# Patient Record
Sex: MALE | Employment: PART TIME | URBAN - METROPOLITAN AREA
[De-identification: names, ages, dates, MRNs, and addresses within clinical notes are randomized per-mention and may not be internally consistent; named-entity substitution may affect disease eponyms.]

---

## 2024-08-20 ENCOUNTER — OFFICE VISIT (OUTPATIENT)
Dept: SURGERY | Age: 29
End: 2024-08-20

## 2024-08-20 VITALS
SYSTOLIC BLOOD PRESSURE: 122 MMHG | HEIGHT: 65 IN | DIASTOLIC BLOOD PRESSURE: 81 MMHG | WEIGHT: 151 LBS | TEMPERATURE: 98.4 F | HEART RATE: 64 BPM | OXYGEN SATURATION: 100 % | BODY MASS INDEX: 25.16 KG/M2

## 2024-08-20 DIAGNOSIS — L29.0 ANAL ITCHING: Primary | ICD-10-CM

## 2024-08-20 DIAGNOSIS — K64.1 GRADE II HEMORRHOIDS: ICD-10-CM

## 2024-08-20 PROCEDURE — 99204 OFFICE O/P NEW MOD 45 MIN: CPT | Performed by: SURGERY

## 2024-08-20 NOTE — PROGRESS NOTES
[facility]ANS [facility]  [facility] COLORECTAL SURGERY  [address] [place] [address]  [address]  [place] [address]  Dept: [phone]  Dept Fax: [phone]  Loc: [phone]    Visit Date: 8/20/2024    Aj Regan is a 29 y.o. male who presents today for: New Patient (Anal itching)      HPI:       Aj Regan is a 29 y.o. male referred to me for further evaluation guarding anal itching.  History and discussion was done with in person official     Aj tells me he has had symptoms for almost 2 months.  He went to the ER for similar issues.  Denies family history of colorectal cancer.  Denies previous colonoscopy.  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    No past medical history on file.    No past surgical history on file.    Cancer-related family history is not on file.    Social History:   Social History     Tobacco Use    Smoking status: Never    Smokeless tobacco: Never   Substance Use Topics    Alcohol use: Never      Tobacco cessation counseling provided as appropriate.    No colonoscopy on file   No cologuard on file  No FIT/FOBT on file   No flexible sigmoidoscopy on file      Objective:     Physical Exam   /81   Pulse 64   Temp 98.4 °F (36.9 °C) (Infrared)   Ht 1.651 m (5' 5\")   Wt 68.5 kg (151 lb)   SpO2 100%   BMI 25.13 kg/m²   Constitutional: Appears well-developed and well-nourished. Grooming appropriate. No gross deformities. Body mass index is 25.13 kg/m².    Abdominal/wound: Soft, nontender    Anorectal Exam: Chaperone present in room throughout exam.  Patient placed in the left lateral position.  Buttocks spread.  Digital rectal exam performed with lubricated finger.  Anoscopy performed.  Findings reveal: Mild perianal irritation, and large internal hemorrhoids    Labs reviewed: None  Radiology reviewed: None  Last colonoscopy: None  Coordination/discussion with: None    Assessment/Plan:     A/P:  New undiagnosed

## 2024-08-20 NOTE — PATIENT INSTRUCTIONS
PRURITIS ANI = \"Itchy butt\" AND Excessive Moisture and Anal seepage Instructions    Follow these steps:    1. Ensure soft stools without diarrhea:    - Use twice daily fiber supplementation (Metamucil, Benefiber, Citrucel, or generic brand)     - men should aim for 30 grams of fiber daily     - women should aim for 25 grams of fiber daily    - Slowly increase fiber supplement intake until you reach these goals    - Drink 6-8 glasses of water, and incorporate healthy fruits and vegetables into your diet.    If stools are still hard, add daily MiraLax  If stools are too loose, slowly add Imodium or Lomotil    2. Do not strain or push on the toilet. Do not read or play games on the toilet.    3.  Stop using perfumes, soaps, clothing dye, fabric softeners, or strong detergents in your laundry. While showering, use non-scented Dove soap and a hand-held shower head to gently cleanse your bottom. Dry using low heat from a hair dryer.    4. After bowel movements, cleanse gently with non-medicated moist baby wipes. Let air dry, and then use a zinc-based barrier cream (such as Desitin or Calmoseptine). Wear loose fitting clothing. Avoid using rough toilet paper or excessive wiping.    5. Avoid the following items in your diet that can cause weakening of the sphincter muscle. STOP for 2 weeks, then slowly reintroduce back one-by-one in a trial fashion:    Alcohol, tea, coffee, caffeine, cola, chocolate, tomatoes/ketchup, spicy foods, and excessive dairy.    - Keep a diary to carefully track your diet and your stool changes    6. Avoid over-the-counter remedies, creams, and suppositories, as these may contain various chemicals that can irritate the skin.    7. If you feel the urge to scratch - use a baby wipe to clean the area, then reapply Desitin/Calmoseptine cream, and most importantly, DO NOT SCRATCH!    Follow-up as instructed, typically in 6-8 weeks.       <<-----Click on this checkbox to enter Procedure Metatarsal osteotomy with Adair osteotomy for correction of hallux valgus, with correction of hammer toe  07/20/2018  Colton/Adair, Mod. Lida, KAMILLE releasae 2 and 3, T&C 2-5, Exc. STM right foot  Active  DLIVINGSTON

## 2024-08-27 ENCOUNTER — OFFICE VISIT (OUTPATIENT)
Dept: SURGERY | Age: 29
End: 2024-08-27

## 2024-08-27 VITALS
HEART RATE: 66 BPM | BODY MASS INDEX: 25.99 KG/M2 | HEIGHT: 65 IN | SYSTOLIC BLOOD PRESSURE: 135 MMHG | TEMPERATURE: 99 F | WEIGHT: 156 LBS | DIASTOLIC BLOOD PRESSURE: 77 MMHG | OXYGEN SATURATION: 100 %

## 2024-08-27 DIAGNOSIS — K64.1 GRADE II HEMORRHOIDS: ICD-10-CM

## 2024-08-27 DIAGNOSIS — L29.0 ANAL ITCHING: Primary | ICD-10-CM

## 2024-08-27 PROCEDURE — 46221 LIGATION OF HEMORRHOID(S): CPT | Performed by: SURGERY

## 2024-08-27 NOTE — PROGRESS NOTES
INTERNAL HEMORRHOID RUBBER BAND LIGATION PROCEDURE NOTE:    Patient presented with symptomatic internal hemorrhoids unresponsive to conservative management. See previous office notes for details of previous history and treatments.     Risks of rubber band ligation explained to patient, including but not limited to: immediate and delayed bleeding, pain, infection, external hemorrhoids thrombosis, pelvic sepsis, urinary retention, sphincter dysfunction, need for additional procedures, and recurrence. Patient was previously provided with information in office AVS (after visit summary) and given opportunity to ask any questions and bring up any concerns.     Chaperone/MA present in room during entire procedure.    Patient was placed in either lateral or knee-chest positioning depending on patient preference. Exam table manipulated for proper visualization and lighting. Buttocks spread. Digital exam and anoscopy performed confirming internal hemorrhoids.    Suction rubber band ligator used to place band in 3 positions, 1 cm proximal to the dentate line, at the apex of the internal hemorrhoid.    Anoscope removed. Patient tolerated the procedure well without complication. EBL minimal. Post procedure expectations and instructions explained to patient. Written instructions provided as well.    Disposition: Follow up PRN       Electronically signed by Sal Johansen MD on 8/27/2024 at 9:21 AM

## 2024-08-27 NOTE — PATIENT INSTRUCTIONS
DISCHARGE INSTRUCTIONS:     Rubber Band Ligation for Hemorrhoids: Before, During, and After Your Procedure    What is rubber band ligation?        Rubber band ligation treats hemorrhoids. Hemorrhoids are swollen veins in the rectal area that can commonly cause bleeding, excess tissue (prolapse), discomfort, and difficulty keeping clean. This treatment is only for internal hemorrhoids.    To do the procedure, your doctor puts a special viewing tool into your anus. This tool is called an anoscope. The doctor then uses a suction tool to grab the hemorrhoid and put a rubber band around it. The band stops the blood flow. This causes the hemorrhoids to shrink and fall off in 2 to 10 days, and purposeful scarring of the tissue back into the rectum.    This procedure is done in the office. Typically one hemorrhoid is treated at a time during your first visit. More can be treated if a repeat procedure is required. The procedure takes about 10-15 minutes. You can go home when it's done. Some people are able to return to regular activities right away.    It's very important not to strain when you have a bowel movement before and after your procedure. Continue with your daily fiber supplement (metamucil, benefiber, konsyl, generic brand), healthy fruits and vegetables, plenty of water (4-6 glasses per day), and MiraLax as needed. Stools should be soft and easy to pass, but not diarrhea.    Preparing for the procedure  Understand exactly what procedure is planned, along with the risks, benefits, and other options.  If you take blood thinners, such as warfarin (Coumadin), clopidogrel (Plavix), or aspirin, be sure to talk to your doctor. He or she will tell you if you should stop taking these medicines before your procedure. Make sure that you understand exactly what your doctor wants you to do.  Please perform a fleets enema 1-2 hours before your appointment. This will insure the rectum is cleaned out. This can be provided from  procedure.  Dr Johansen's office phone # is [phone]  If you are unable to reach the office (outside of normal business hours) and you have any concerns, go to your nearest emergency room.

## 2024-09-10 ENCOUNTER — TELEPHONE (OUTPATIENT)
Dept: SURGERY | Age: 29
End: 2024-09-10

## 2024-09-10 NOTE — TELEPHONE ENCOUNTER
Patient states he had an RBL completed on 08/27/2024.  4-5 Days ago he started experiencing intense rectal itching that continues today. Patient denies any pain and any other symptoms at this time.     He would like to know if this is normal, if he needs to be seen, or if he needs medication.     Patient states if the office would like to call in a medication to the pharmacy that his pharmacy preference is:     [facility] - [address] [place] [address]  Phone number: [phone]    Patient can be reached at [phone]. Patient needs a Yoruba INT on the line for callbacks.

## 2025-01-08 ENCOUNTER — OFFICE VISIT (OUTPATIENT)
Dept: SURGERY | Age: 30
End: 2025-01-08

## 2025-01-08 DIAGNOSIS — K64.1 GRADE II HEMORRHOIDS: ICD-10-CM

## 2025-01-08 DIAGNOSIS — L29.0 ANAL ITCHING: Primary | ICD-10-CM

## 2025-01-08 PROCEDURE — 99213 OFFICE O/P EST LOW 20 MIN: CPT | Performed by: SURGERY

## 2025-01-08 RX ORDER — CLOBETASOL PROPIONATE 0.5 MG/G
OINTMENT TOPICAL
Qty: 15 G | Refills: 0 | Status: SHIPPED | OUTPATIENT
Start: 2025-01-08

## 2025-01-08 NOTE — PROGRESS NOTES
[facility]ANS [place] [facility] COLORECTAL SURGERY  [address] [place] [address]  [address]  [place] [id number]  Dept: [phone]  Dept Fax: [phone]  Loc: [phone]    Visit Date: 1/8/2025    Aj Regan is a 29 y.o. male who presents today for: Follow-up (Anal itching)      HPI:       Aj Regan is a 29 y.o. male known to me for history of pruritus ani as well as internal hemorrhoids.  History gathered with official  in the office today.  States he is still having itchiness but no bleeding.  Has tried dietary changes and has had minimal improvement    Objective:     Physical Exam   There were no vitals taken for this visit.  Constitutional: Appears well-developed and well-nourished. Grooming appropriate. No gross deformities. There is no height or weight on file to calculate BMI.    Abdominal/wound: Soft, nontender    Anorectal Exam: Chaperone present in room throughout exam.  Patient placed in the left lateral position.  Buttocks spread.    Digital rectal exam performed with lubricated finger.  Anoscopy performed.    Findings reveal: Essentially normal anorectal exam    Labs reviewed: None  Imaging reviewed: None  Coordination/discussion of care: None    No colonoscopy on file   No cologuard on file  No FIT/FOBT on file   No flexible sigmoidoscopy on file      Assessment/Plan:       A/P:  Established problem(s): Pruritus ani  Additional workup/treatment planned: Trial high-dose steroid ointment  Risk of complications/morbidity: Moderate    At this point we have tried hemorrhoid banding and he has tried dietary changes.  He is still having symptoms.  We can try a short course of high-dose steroid ointment to see if this helps.    Continue with current prescription medications      DISPOSITION: Follow-up 2 months    My findings will be relayed to consulting practitioner or PCP via Epic note    Note completed using dictation software, please excuse any

## 2025-03-11 ENCOUNTER — OFFICE VISIT (OUTPATIENT)
Dept: SURGERY | Age: 30
End: 2025-03-11

## 2025-03-11 VITALS
DIASTOLIC BLOOD PRESSURE: 74 MMHG | HEART RATE: 78 BPM | BODY MASS INDEX: 24.66 KG/M2 | OXYGEN SATURATION: 98 % | TEMPERATURE: 98.7 F | WEIGHT: 148 LBS | SYSTOLIC BLOOD PRESSURE: 117 MMHG | HEIGHT: 65 IN

## 2025-03-11 DIAGNOSIS — K64.1 GRADE II HEMORRHOIDS: ICD-10-CM

## 2025-03-11 DIAGNOSIS — L29.0 ANAL ITCHING: Primary | ICD-10-CM

## 2025-03-11 PROCEDURE — 99214 OFFICE O/P EST MOD 30 MIN: CPT | Performed by: SURGERY

## 2025-03-11 RX ORDER — CAPSAICIN 0.025 %
CREAM (GRAM) TOPICAL
Qty: 25 G | Refills: 1 | Status: SHIPPED | OUTPATIENT
Start: 2025-03-11 | End: 2025-04-10

## 2025-03-11 NOTE — PROGRESS NOTES
[facility] PHYSICIANS [place] [facility] COLORECTAL SURGERY  [address] [place] [address]  [address]  [place] [address]  Dept: [phone]  Dept Fax: [phone]  Loc: [phone]    Visit Date: 3/11/2025    Aj Regan is a 29 y.o. male who presents today for: Follow-up (Anal itching)      HPI:       Aj Regan is a 29 y.o. male known to me for history of anal irritation and pruritus ani as well as hemorrhoids.  Unfortunately, still having symptoms and irritation, not helped by clobetasol steroid ointment.    History and counseling with official  through virtual iPad program    Objective:     Physical Exam   /74   Pulse 78   Temp 98.7 °F (37.1 °C) (Infrared)   Ht 1.651 m (5' 5\")   Wt 67.1 kg (148 lb)   SpO2 98%   BMI 24.63 kg/m²   Constitutional: Appears well-developed and well-nourished. Grooming appropriate. No gross deformities. Body mass index is 24.63 kg/m².    Abdominal/wound: soft, nonteder    Anorectal Exam: Chaperone present in room throughout exam.  Patient placed in the left lateral position.  Buttocks spread.    Digital rectal exam performed with lubricated finger.  Anoscopy performed.    Findings reveal: Mild perianal irritation, mild external skin tags/hemorrhoids    No colonoscopy on file   No cologuard on file  No FIT/FOBT on file   No flexible sigmoidoscopy on file    Assessment/Plan:       A/P:  Established problem(s): Persistent pruritus ani  Additional workup/treatment planned: Trial capsaicin ointment, consider EUA  Risk of complications/morbidity: Moderate    Unfortunately at this point Aj continues to have anal irritation.  He has trialed hemorrhoid banding, topical management, topical steroids.  We will try capsaicin ointment.  If he is still having trouble after that, could consider exam under anesthesia and excision of small hemorrhoids, but he does understand that he is still at risk for continued symptoms.    Continue with

## 2025-05-13 ENCOUNTER — OFFICE VISIT (OUTPATIENT)
Dept: SURGERY | Age: 30
End: 2025-05-13

## 2025-05-13 VITALS
BODY MASS INDEX: 24.49 KG/M2 | WEIGHT: 147 LBS | OXYGEN SATURATION: 98 % | HEIGHT: 65 IN | DIASTOLIC BLOOD PRESSURE: 80 MMHG | TEMPERATURE: 98.4 F | HEART RATE: 66 BPM | SYSTOLIC BLOOD PRESSURE: 123 MMHG

## 2025-05-13 DIAGNOSIS — L29.0 ANAL ITCHING: ICD-10-CM

## 2025-05-13 DIAGNOSIS — K64.1 GRADE II HEMORRHOIDS: Primary | ICD-10-CM

## 2025-05-13 PROCEDURE — 99214 OFFICE O/P EST MOD 30 MIN: CPT | Performed by: SURGERY

## 2025-05-13 NOTE — PROGRESS NOTES
[facility] PHYSICIANS [place] [facility] COLORECTAL SURGERY  [address] [place] [address]  [address]  [place] [id number]  Dept: [phone]  Dept Fax: [phone]  Loc: [phone]    Visit Date: 5/13/2025    Aj Regan is a 29 y.o. male who presents today for: Follow-up (hemorrhoids)      HPI:       jA Regan is a 29 y.o. male known to me for chronic anal itching.      History obtained and counseling performed with official  on iPad system    He has now tried the capsaicin ointment in addition to steroid ointment and previous hemorrhoid banding.  Still having itching.  Quite frustrated at this point      Objective:     Physical Exam   /80   Pulse 66   Temp 98.4 °F (36.9 °C) (Infrared)   Ht 1.651 m (5' 5\")   Wt 66.7 kg (147 lb)   SpO2 98%   BMI 24.46 kg/m²   Constitutional: Appears well-developed and well-nourished. Grooming appropriate. No gross deformities. Body mass index is 24.46 kg/m².    Abdominal/wound: Soft, nontender    Anorectal Exam: Chaperone present in room throughout exam.  Patient placed in the left lateral position.  Buttocks spread.    Digital rectal exam performed with lubricated finger.  Anoscopy performed.    Findings reveal: Very mild internal hemorrhoids, no source of anal itching noted    No colonoscopy on file   No cologuard on file  No FIT/FOBT on file   No flexible sigmoidoscopy on file      Assessment/Plan:       A/P:  Established problem(s): Chronic pruritus ani  Additional workup/treatment planned: Second opinion with Dr. Matias versus dermatology  Risk of complications/morbidity: Moderate     At this point unfortunately Aj continues to have anal itching symptoms.  He has trialed conservative methods, steroid ointment, capsaicin ointment and even hemorrhoid banding in the past.  He is still having symptoms and is minimally improved by any of these other options.    At this point I am at a loss.  I do not think that an EUA

## 2025-06-02 ENCOUNTER — OFFICE VISIT (OUTPATIENT)
Dept: SURGERY | Age: 30
End: 2025-06-02

## 2025-06-02 VITALS
OXYGEN SATURATION: 98 % | HEIGHT: 65 IN | BODY MASS INDEX: 24.66 KG/M2 | DIASTOLIC BLOOD PRESSURE: 78 MMHG | TEMPERATURE: 98.6 F | SYSTOLIC BLOOD PRESSURE: 124 MMHG | HEART RATE: 70 BPM | WEIGHT: 148 LBS

## 2025-06-02 DIAGNOSIS — L29.0 ANAL ITCHING: Primary | ICD-10-CM

## 2025-06-02 PROCEDURE — 99213 OFFICE O/P EST LOW 20 MIN: CPT | Performed by: SURGERY

## 2025-06-02 RX ORDER — ALBENDAZOLE 200 MG/1
400 TABLET, FILM COATED ORAL ONCE
Qty: 2 TABLET | Refills: 0 | Status: SHIPPED | OUTPATIENT
Start: 2025-06-02 | End: 2025-06-02

## 2025-06-02 RX ORDER — TERBINAFINE HYDROCHLORIDE 250 MG/1
250 TABLET ORAL DAILY
Qty: 14 TABLET | Refills: 0 | Status: SHIPPED | OUTPATIENT
Start: 2025-06-02 | End: 2025-06-16

## 2025-06-02 NOTE — PROGRESS NOTES
Subjective:     Patient is a 29 y.o. man with anal itch    HPI: Mr. Regan reports a year of anal itching. Has tried a number of different therapies without relief. He has some cell phone photos of a circumferential anal rash. Itching is often worse after BM.     No Known Allergies   Social History     Tobacco Use    Smoking status: Never    Smokeless tobacco: Never   Substance Use Topics    Alcohol use: Never     Objective:     GEN: appears well, no distress, appears stated age  PSYCH: normal mood, normal affect  NECK: no neck masses, trachea midline  ENT: moist oral mucosa; anicteric  SKIN: no rash or jaundice  CV: regular heart rate and rhythm  PULM: normal respiratory effort, no wheezing  GI: soft non distended abdomen. Normal bowel sounds  RECTAL: Discussed role of rectal exam and/or anoscopy to evaluate for anorectal pathology and patient elected to proceed. examined with chaperone Skylar Howell MA. External: + rash also striae along both buttocks and inner thighs, no fissure, no fistula, tiny hemorrhoids exteranl.   EXT/NEURO: normal gait, strength/sensation grossly intact in all extremities    Assessment:     Perianal itching     Plan:     About 1 year of symptoms having failed several treatments    Differential includes pinworms and tinea infection. Would start oral anti-fungal and single dose albendazole to see if this resolves the issue     Could consider EUA or derm referral if the above not helping    Warren Matias M.D.  6/2/25   3:33 PM             no

## 2025-06-23 ENCOUNTER — OFFICE VISIT (OUTPATIENT)
Dept: SURGERY | Age: 30
End: 2025-06-23

## 2025-06-23 VITALS
TEMPERATURE: 98.9 F | BODY MASS INDEX: 24.16 KG/M2 | SYSTOLIC BLOOD PRESSURE: 119 MMHG | HEIGHT: 65 IN | DIASTOLIC BLOOD PRESSURE: 75 MMHG | WEIGHT: 145 LBS | HEART RATE: 60 BPM | OXYGEN SATURATION: 99 %

## 2025-06-23 DIAGNOSIS — R21 PERIANAL RASH: Primary | ICD-10-CM

## 2025-06-23 PROCEDURE — 99213 OFFICE O/P EST LOW 20 MIN: CPT | Performed by: SURGERY

## 2025-06-23 RX ORDER — CICLOPIROX OLAMINE 7.7 MG/G
CREAM TOPICAL
Qty: 30 G | Refills: 2 | Status: SHIPPED | OUTPATIENT
Start: 2025-06-23

## 2025-06-23 NOTE — PROGRESS NOTES
Subjective:     Patient is a 29 y.o. man with perianal rash     HPI: Mr. Regan reports his itching is better after a short course of Lamisil and a single dose of anti helminth. He is concerned when goes outside and works in the heat and sweats it will get worse.     No Known Allergies   Social History     Tobacco Use    Smoking status: Never    Smokeless tobacco: Never   Substance Use Topics    Alcohol use: Never      Objective:     Vitals:    06/23/25 1323   BP: 119/75   Pulse: 60   Temp: 98.9 °F (37.2 °C)   SpO2: 99%     GEN: appears well, no distress, appears stated age  PSYCH: normal mood, normal affect  NECK: no neck masses, trachea midline  ENT: moist oral mucosa; anicteric  SKIN: no rash or jaundice  CV: regular heart rate and rhythm  PULM: normal respiratory effort, no wheezing  GI: soft non distended abdomen. Normal bowel sounds  RECTAL: Discussed role of rectal exam and/or anoscopy to evaluate for anorectal pathology and patient elected to proceed. examined with chaperone Skylar Howell MA. External: mild rash with central clearing and symmetric red ring, no fissure, no fistula, no hemorrhoid.   EXT/NEURO: normal gait, strength/sensation grossly intact in all extremities      Assessment:     Tinea cruris     Plan:     Continue anti-fungal treatment with Ciclopirox. See me 6 weeks (can cancel if needed)    Warren Matias M.D.  6/23/25   1:34 PM

## 2025-06-23 NOTE — CONSULTS
Session ID: [id number]  Session Duration: 11 minutes  Language: Malaysian   ID: #[id number]   Name: Miguel